# Patient Record
(demographics unavailable — no encounter records)

---

## 2024-10-10 NOTE — DISCUSSION/SUMMARY
[de-identified] : The patient has osteoarthritis of the right hip.  This was exacerbated by her injury.  She is having only mild relief from nabumetone at this point.  Treatment options were discussed.  She is interested in a corticosteroid injection.  She is given a referral for right hip arthrogram and steroid injection.  She will let us know how she is done following the injection.

## 2024-10-10 NOTE — PHYSICAL EXAM
[Slightly Antalgic] : slightly antalgic [LE] : Sensory: Intact in bilateral lower extremities [DP] : dorsalis pedis 2+ and symmetric bilaterally [Normal] : Alert and in no acute distress [Poor Appearance] : well-appearing [Acute Distress] : not in acute distress [Obese] : not obese [de-identified] : The patient has no respiratory distress. Mood and affect are normal. The patient is alert and oriented to person, place and time. The patient reports no pain with motion of the lumbar spine.  There is no lumbar spine tenderness.  There is a scar from prior surgery.  Examination of the hips demonstrates right groin pain with rotation.  There is no tenderness of the trochanter.  There is mild anterior right hip tenderness.  There is no tenderness of either knee.  There are scars at both knees.  Calves are soft and nontender.  The skin is intact.  There is no lymphedema. [de-identified] :  EXAM: [id number] - MR HIP RT  - ORDERED BY: HECTOR WIGGINS PROCEDURE DATE:  09/26/2024 INTERPRETATION:  EXAMINATION: MRI of the right hip without contrast  CLINICAL INFORMATION: Right hip pain  TECHNIQUE: Multiplanar, multisequential MR imaging was performed.  FINDINGS: There is no fracture or osteonecrosis.  There is a physiologic amount of right hip joint fluid. There is complex degenerative tearing of the anterior and superior acetabular labrum. There is high-grade chondral wear along the anterosuperior aspect of the acetabulum with underlying subchondral cystic change. There is also moderate grade chondral wear and deep chondral fissuring of the central superior aspect of the femoral head with underlying subchondral marrow edema. There are small osteophytes on both sides of the hip.  There is chronic moderate to high-grade partial tearing of the gluteus minimus and medius tendons at their insertions and of the hamstring tendons at their origins with some peritendinous edema at these sites. There are no retracted tendon tears. There is moderate to severe atrophy of the gluteus minimus muscle and mild to moderate atrophy of the gluteus medius muscle.  There are no masses or collections along the course of the imaged neurovascular structures.  There are degenerative changes at the pubic symphysis.  IMPRESSION: Moderate to advanced right hip osteoarthritis, as characterized above. Chronic moderate to high-grade partial tearing of the gluteus minimus and medius tendons at their insertions and of the hamstring tendons at their origin.  --- End of Report ---     REYNA NI MD; Attending Radiologist This document has been electronically signed. Sep 26 2024  4:06PM

## 2024-10-10 NOTE — HISTORY OF PRESENT ILLNESS
[TextBox_4] : Ms. MUNOZ is a 77 year old female with a history of orthopedic injuries and decay (spine, shoulders), appendicitis, gall bladder disease, fibroid uterus, HTN, HLD, hypothyroid, IBS, arthritis, PMR, family history of lung cancer (mother), GERD who now comes in for an follow up pulmonary evaluation. Her chief complaint is  -she notes feeling generally well -she notes developing colitis which she is treating with Rx  -she notes issues with her esophagus -she notes poor quality of sleep -she notes nocturia x 3-4  -she notes snoring  -she notes her breathing is stable -she notes sinuses are quiet -she notes frequent cough   -she notes weight is stable -she notes she is trying to lose weight  -she denies having OSAS  -she denies going for a sleep study  -she notes arthritis  -she notes having hernia repair   -she denies any headaches, nausea, emesis, fever, chills, sweats, chest pain, chest pressure, coughing, wheezing, palpitations, diarrhea, constipation, dysphagia, vertigo, myalgias, leg swelling, itchy eyes, itchy ears, heartburn, reflux, or sour taste in the mouth.

## 2024-10-10 NOTE — PROCEDURE
[FreeTextEntry1] : PFTs revealed normal flows; FEV1 was  1.68 L, which is 98% of predicted; normal flow volume loop.  PFTs were performed to evaluate for asthma  FENO was 37; a normal value being less than 25 Fractional exhaled nitric oxide (FENO) is regarded as a simple, noninvasive method for assessing eosinophilic airway inflammation. Produced by a variety of cells within the lung, nitric oxide (NO) concentrations are generally low in healthy individuals. However, high concentrations of NO appear to be involved in nonspecific host defense mechanisms and chronic inflammatory diseases such as asthma. The American Thoracic Society (ATS) therefore has recommended using FENO to aid in the diagnosis and monitoring of eosinophilic airway inflammation and asthma, and for identifying steroid responsive individuals whose chronic respiratory symptoms may be caused by airway inflammation.   The American Thoracic Society (ATS) strongly recommends the use of FeNO measurement to aid in the assessment, management, and long-term monitoring of asthma. In their 2011 clinical practice guideline, the ATS emphasizes the importance of using FeNO.

## 2024-10-10 NOTE — ADDENDUM
The ECG shows a sinus rhythm. ECG rate  = 86 bpm. [FreeTextEntry1] : Documented by Song Proctor acting as a scribe for Dr. Levon Nichole on 10/10/2024. All medical record entries made by the Scribe were at my, Dr. Levon Nichole's, direction and personally dictated by me on 10/10/2024. I have reviewed the chart and agree that the record accurately reflects my personal performance of the history, physical exam, assessment and plan. I have also personally directed, reviewed, and agree with the discharge instructions.

## 2024-10-10 NOTE — HISTORY OF PRESENT ILLNESS
[de-identified] : Patient presents for reevaluation of right hip pain. She had an MRI which was negative for fracture. She is still having significant pain, taking Nabumetone currently with some relief. She would like to discuss injection for the right hip.

## 2024-10-10 NOTE — REASON FOR VISIT
[Follow-Up] : a follow-up visit [TextBox_44] : SOB, chronic cough, chronic rhinitis, LPR/GERD, no bronchiectasis, ?RAY

## 2024-10-25 NOTE — ASSESSMENT
[FreeTextEntry1] : The patient has lengthy hand history with many problems. Today, patient's primary complaints are pain and associated interference with ADLs secondary to basal joint arthritis, bilaterally.  Patient requested bilateral basal joint cortisone injections.  Given symptomatology, given degree of arthritis demonstrated on x-rays today, cortisone injections are indicated.  Right and left thumb basal joint injections were administered after preinjection with lidocaine field block and were well-tolerated without complication.  It is highly likely that her injections were successfully administered intra-articularly, but the possibility that the needle was not intra-articular was discussed and accepted by patient prior to injection. Patient should be cautious in activities for 7 to 14 days.  If comfortable patient can resume regular activities thereafter.  If either thumb does not improve, then ultrasound-guided cortisone injection would be recommended.  Patient tentatively accepts this referral if needed.  Patient has no active trigger fingers.  Patient reports that carpal tunnel syndrome symptoms are not active.  Patient has minimal residual pain left wrist after distal radius/radial styloid fracture.  This does not interfere with function.  Patient has no other active hand problem requiring treatment. Prognosis uncertain/limited.  A lengthy and detailed discussion was held with the patient regarding analysis, treatment, and recommendations. All questions have been answered. At the conclusion the patient expressed acceptance, understanding and agreement with the plan.

## 2024-10-25 NOTE — HISTORY OF PRESENT ILLNESS
[FreeTextEntry1] : Patient is 75 yo LHD female with history of multiple hand problems and treatments.  Past hand history: 1. Right CTR 11/3/2023. First postop visit 11/15/2023. 2. Left CTS cortisone injection 11/3/2023. 3. Right trigger thumb cortisone injection 12/28/2022, 3/29/2023. 4. Left carpal tunnel syndrome, NCS: B/O incomplete study, CTS not confirmed. Cortisone injection 3/29/2023. 5. Right carpal tunnel syndrome NCS positive. Cortisone injection 3/29/2023 6. Lt thumb trigger injection: 9/7/2022, 6/27/2022 7. Right basal joint Kenalog injection: 6/27/2022, 8/11/2021, 9/9/2020 8. Left basal joint Kenalog injection 8/21/2021, May 2016, September 2015 9. Left distal radius fracture 12/4/2023, 1/25/2024: Partial filling of fracture line. 2 mm articular step-off. 2.6 mm scapholunate. Ongoing discomfort.  Orthopedic history: Left reverse TSA Matthias Merino MD, approx 9/27/2022. Pt reports that left reverse TSA "is failing". Patient reports that she has not had reconstruction/revision and would like to avoid it.  TODAY: Patient last seen 1/25/2024.   Patient had appointment scheduled 3/27/2024 but did not present for that appointment. The patient reports pain in both basal joints more severe on right than left. Patient requests cortisone injections for both basal joints, today. Patient states that the pain interferes with function.  I explained that cortisone injection would only provide temporary improvement. Patient acknowledges this limitation but still wishes to have injection. Patient reports no numbness or tingling in fingers. Pt expressed considerable satisfaction regarding improvement following left carpal tunnel cortisone injection last year. Patient is having little to no pain related to the left distal radius fracture. Patient denies locking, triggering or other new hand complaints.

## 2024-10-25 NOTE — PROCEDURE
[] : left and right  [FreeTextEntry1] : The injection was done following verbal timeout site verification, in 2 phases with the first phase being subcutaneous injection of lidocaine 1.5 cc subcutaneously as anesthetic. When adequate level of anesthesia was achieved, the cortisone injection was performed without complication.  KENALOG 40 mg injected bilaterally.  NO Celestone injected. Secondary response to Kenalog with facial flushing, etc., discussed.

## 2024-10-25 NOTE — PHYSICAL EXAM
[de-identified] : Right wrist: Radial artery is slightly tortuous. There is no clinical evidence of aneurysm or other enlargement of the radial artery. painless ROM. Wrist joint line, carpus, radius and ulna: nontender. No evidence of ganglion or other wrist masses. No other notable wrist pertinent positive findings.  Right basal joint: Markedly prominent with first metacarpal adduction. Joint line palpation and joint manipulation causes considerable pain recreating patient's primary complaint. Thumb MP compensatory hyperextension.  No pain associated. right hand: Carpal tunnel incision fully healed and blended with surrounding skin. No A1 pulley tenderness and no triggering in any finger. No pertinent MP, PIP, or DIP joint contributory findings, except some Heberden's nodes; none are clinically painful. No other notable right hand pertinent positive findings.  Left wrist  Very slight tenderness over distal radius dorsally, radially, not volarly. No tenderness over ulna. Slight tenderness over scapholunate interval, which is not particularly bothersome to patient by her description. Scaphoid shift mild discomfort good stability Wrist E/F 45/45; min pain at max flexion/extension No other notable left wrist pertinent positive findings  Basal joint: adduction stiffness and prominence Basal joint line tenderness 1-2+. Basal joint manipulation 1 plus crepitus which recreates symptoms. Pain on the left at the basal joint is much less than right but severe enough that patient requests cortisone injection today. Thumb MP compensatory hyperextension  Left hand No A1 pulley tenderness and no triggering in any finger. No pertinent MP, PIP, or DIP joint contributory findings, except some Heberden's nodes; none are clinically painful.  Neurologic: Median, ulnar, and radial motor and sensory are intact.  Normal light touch sensation at rest. Phalen's test with median nerve compression: Negative bilaterally. Skin: No notable ecchymosis or notable skin changes. No cyanosis, clubbing, or rashes. Vascular: Radial pulses intact. Lymphatic: No streaking or epitrochlear adenopathy. The patient is awake, alert, and oriented. Affect appropriate. Cooperative.   [de-identified] : Radiographs ordered and interpreted by me today, reviewed and discussed with the patient today.  3 views right wrist, basal joint, hand Basal joint marked dysplasia. Large concave dysplastic change distal trapezium with large osteophyte and exostoses formed. Rounding first metacarpal base. Radiographically stage III basal joint arthritis. STT joint spaces maintained. MP and PIP joints without notable degenerative change. Osteophyte formation thumb distal phalanx dorsal radial aspect. Tiny osteophyte ulnar corner distal phalanx DIP 2. No other notable changes. No fractures or dislocations  4 views left wrist, basal joint, hand. Distal radius fracture has healed. 2 mm step-off between scaphoid facet and remaining portion of radius articular surface. Small lucency between scaphoid facet and lunate facet. Small osteophyte ulnar head proximally. Lateral radiograph: Good alignment of distal radius. Minimal if any narrowing between proximal scaphoid and radial edge of lunate facet. Radiolunate and midcarpal spaces maintained. Marked degenerative change between trapezium and first metacarpal with loss of joint space. Large osteophytes present. Subchondral cyst within first metacarpal. MP and PIP joints maintained without notable degenerative change. Mild degenerative change with small marginal radiodensities DIP 2, 3. No evidence of acute fracture or dislocation.

## 2024-10-25 NOTE — HISTORY OF PRESENT ILLNESS
[FreeTextEntry1] : Patient is 77 yo LHD female with history of multiple hand problems and treatments.  Past hand history: 1. Right CTR 11/3/2023. First postop visit 11/15/2023. 2. Left CTS cortisone injection 11/3/2023. 3. Right trigger thumb cortisone injection 12/28/2022, 3/29/2023. 4. Left carpal tunnel syndrome, NCS: B/O incomplete study, CTS not confirmed. Cortisone injection 3/29/2023. 5. Right carpal tunnel syndrome NCS positive. Cortisone injection 3/29/2023 6. Lt thumb trigger injection: 9/7/2022, 6/27/2022 7. Right basal joint Kenalog injection: 6/27/2022, 8/11/2021, 9/9/2020 8. Left basal joint Kenalog injection 8/21/2021, May 2016, September 2015 9. Left distal radius fracture 12/4/2023, 1/25/2024: Partial filling of fracture line. 2 mm articular step-off. 2.6 mm scapholunate. Ongoing discomfort.  Orthopedic history: Left reverse TSA Matthias Merino MD, approx 9/27/2022. Pt reports that left reverse TSA "is failing". Patient reports that she has not had reconstruction/revision and would like to avoid it.  TODAY: Patient last seen 1/25/2024.   Patient had appointment scheduled 3/27/2024 but did not present for that appointment. The patient reports pain in both basal joints more severe on right than left. Patient requests cortisone injections for both basal joints, today. Patient states that the pain interferes with function.  I explained that cortisone injection would only provide temporary improvement. Patient acknowledges this limitation but still wishes to have injection. Patient reports no numbness or tingling in fingers. Pt expressed considerable satisfaction regarding improvement following left carpal tunnel cortisone injection last year. Patient is having little to no pain related to the left distal radius fracture. Patient denies locking, triggering or other new hand complaints.

## 2024-10-25 NOTE — PHYSICAL EXAM
[de-identified] : Right wrist: Radial artery is slightly tortuous. There is no clinical evidence of aneurysm or other enlargement of the radial artery. painless ROM. Wrist joint line, carpus, radius and ulna: nontender. No evidence of ganglion or other wrist masses. No other notable wrist pertinent positive findings.  Right basal joint: Markedly prominent with first metacarpal adduction. Joint line palpation and joint manipulation causes considerable pain recreating patient's primary complaint. Thumb MP compensatory hyperextension.  No pain associated. right hand: Carpal tunnel incision fully healed and blended with surrounding skin. No A1 pulley tenderness and no triggering in any finger. No pertinent MP, PIP, or DIP joint contributory findings, except some Heberden's nodes; none are clinically painful. No other notable right hand pertinent positive findings.  Left wrist  Very slight tenderness over distal radius dorsally, radially, not volarly. No tenderness over ulna. Slight tenderness over scapholunate interval, which is not particularly bothersome to patient by her description. Scaphoid shift mild discomfort good stability Wrist E/F 45/45; min pain at max flexion/extension No other notable left wrist pertinent positive findings  Basal joint: adduction stiffness and prominence Basal joint line tenderness 1-2+. Basal joint manipulation 1 plus crepitus which recreates symptoms. Pain on the left at the basal joint is much less than right but severe enough that patient requests cortisone injection today. Thumb MP compensatory hyperextension  Left hand No A1 pulley tenderness and no triggering in any finger. No pertinent MP, PIP, or DIP joint contributory findings, except some Heberden's nodes; none are clinically painful.  Neurologic: Median, ulnar, and radial motor and sensory are intact.  Normal light touch sensation at rest. Phalen's test with median nerve compression: Negative bilaterally. Skin: No notable ecchymosis or notable skin changes. No cyanosis, clubbing, or rashes. Vascular: Radial pulses intact. Lymphatic: No streaking or epitrochlear adenopathy. The patient is awake, alert, and oriented. Affect appropriate. Cooperative.   [de-identified] : Radiographs ordered and interpreted by me today, reviewed and discussed with the patient today.  3 views right wrist, basal joint, hand Basal joint marked dysplasia. Large concave dysplastic change distal trapezium with large osteophyte and exostoses formed. Rounding first metacarpal base. Radiographically stage III basal joint arthritis. STT joint spaces maintained. MP and PIP joints without notable degenerative change. Osteophyte formation thumb distal phalanx dorsal radial aspect. Tiny osteophyte ulnar corner distal phalanx DIP 2. No other notable changes. No fractures or dislocations  4 views left wrist, basal joint, hand. Distal radius fracture has healed. 2 mm step-off between scaphoid facet and remaining portion of radius articular surface. Small lucency between scaphoid facet and lunate facet. Small osteophyte ulnar head proximally. Lateral radiograph: Good alignment of distal radius. Minimal if any narrowing between proximal scaphoid and radial edge of lunate facet. Radiolunate and midcarpal spaces maintained. Marked degenerative change between trapezium and first metacarpal with loss of joint space. Large osteophytes present. Subchondral cyst within first metacarpal. MP and PIP joints maintained without notable degenerative change. Mild degenerative change with small marginal radiodensities DIP 2, 3. No evidence of acute fracture or dislocation.

## 2024-10-25 NOTE — PHYSICAL EXAM
[de-identified] : Right wrist: Radial artery is slightly tortuous. There is no clinical evidence of aneurysm or other enlargement of the radial artery. painless ROM. Wrist joint line, carpus, radius and ulna: nontender. No evidence of ganglion or other wrist masses. No other notable wrist pertinent positive findings.  Right basal joint: Markedly prominent with first metacarpal adduction. Joint line palpation and joint manipulation causes considerable pain recreating patient's primary complaint. Thumb MP compensatory hyperextension.  No pain associated. right hand: Carpal tunnel incision fully healed and blended with surrounding skin. No A1 pulley tenderness and no triggering in any finger. No pertinent MP, PIP, or DIP joint contributory findings, except some Heberden's nodes; none are clinically painful. No other notable right hand pertinent positive findings.  Left wrist  Very slight tenderness over distal radius dorsally, radially, not volarly. No tenderness over ulna. Slight tenderness over scapholunate interval, which is not particularly bothersome to patient by her description. Scaphoid shift mild discomfort good stability Wrist E/F 45/45; min pain at max flexion/extension No other notable left wrist pertinent positive findings  Basal joint: adduction stiffness and prominence Basal joint line tenderness 1-2+. Basal joint manipulation 1 plus crepitus which recreates symptoms. Pain on the left at the basal joint is much less than right but severe enough that patient requests cortisone injection today. Thumb MP compensatory hyperextension  Left hand No A1 pulley tenderness and no triggering in any finger. No pertinent MP, PIP, or DIP joint contributory findings, except some Heberden's nodes; none are clinically painful.  Neurologic: Median, ulnar, and radial motor and sensory are intact.  Normal light touch sensation at rest. Phalen's test with median nerve compression: Negative bilaterally. Skin: No notable ecchymosis or notable skin changes. No cyanosis, clubbing, or rashes. Vascular: Radial pulses intact. Lymphatic: No streaking or epitrochlear adenopathy. The patient is awake, alert, and oriented. Affect appropriate. Cooperative.   [de-identified] : Radiographs ordered and interpreted by me today, reviewed and discussed with the patient today.  3 views right wrist, basal joint, hand Basal joint marked dysplasia. Large concave dysplastic change distal trapezium with large osteophyte and exostoses formed. Rounding first metacarpal base. Radiographically stage III basal joint arthritis. STT joint spaces maintained. MP and PIP joints without notable degenerative change. Osteophyte formation thumb distal phalanx dorsal radial aspect. Tiny osteophyte ulnar corner distal phalanx DIP 2. No other notable changes. No fractures or dislocations  4 views left wrist, basal joint, hand. Distal radius fracture has healed. 2 mm step-off between scaphoid facet and remaining portion of radius articular surface. Small lucency between scaphoid facet and lunate facet. Small osteophyte ulnar head proximally. Lateral radiograph: Good alignment of distal radius. Minimal if any narrowing between proximal scaphoid and radial edge of lunate facet. Radiolunate and midcarpal spaces maintained. Marked degenerative change between trapezium and first metacarpal with loss of joint space. Large osteophytes present. Subchondral cyst within first metacarpal. MP and PIP joints maintained without notable degenerative change. Mild degenerative change with small marginal radiodensities DIP 2, 3. No evidence of acute fracture or dislocation.

## 2024-10-25 NOTE — PHYSICAL EXAM
[de-identified] : Right wrist: Radial artery is slightly tortuous. There is no clinical evidence of aneurysm or other enlargement of the radial artery. painless ROM. Wrist joint line, carpus, radius and ulna: nontender. No evidence of ganglion or other wrist masses. No other notable wrist pertinent positive findings.  Right basal joint: Markedly prominent with first metacarpal adduction. Joint line palpation and joint manipulation causes considerable pain recreating patient's primary complaint. Thumb MP compensatory hyperextension.  No pain associated. right hand: Carpal tunnel incision fully healed and blended with surrounding skin. No A1 pulley tenderness and no triggering in any finger. No pertinent MP, PIP, or DIP joint contributory findings, except some Heberden's nodes; none are clinically painful. No other notable right hand pertinent positive findings.  Left wrist  Very slight tenderness over distal radius dorsally, radially, not volarly. No tenderness over ulna. Slight tenderness over scapholunate interval, which is not particularly bothersome to patient by her description. Scaphoid shift mild discomfort good stability Wrist E/F 45/45; min pain at max flexion/extension No other notable left wrist pertinent positive findings  Basal joint: adduction stiffness and prominence Basal joint line tenderness 1-2+. Basal joint manipulation 1 plus crepitus which recreates symptoms. Pain on the left at the basal joint is much less than right but severe enough that patient requests cortisone injection today. Thumb MP compensatory hyperextension  Left hand No A1 pulley tenderness and no triggering in any finger. No pertinent MP, PIP, or DIP joint contributory findings, except some Heberden's nodes; none are clinically painful.  Neurologic: Median, ulnar, and radial motor and sensory are intact.  Normal light touch sensation at rest. Phalen's test with median nerve compression: Negative bilaterally. Skin: No notable ecchymosis or notable skin changes. No cyanosis, clubbing, or rashes. Vascular: Radial pulses intact. Lymphatic: No streaking or epitrochlear adenopathy. The patient is awake, alert, and oriented. Affect appropriate. Cooperative.   [de-identified] : Radiographs ordered and interpreted by me today, reviewed and discussed with the patient today.  3 views right wrist, basal joint, hand Basal joint marked dysplasia. Large concave dysplastic change distal trapezium with large osteophyte and exostoses formed. Rounding first metacarpal base. Radiographically stage III basal joint arthritis. STT joint spaces maintained. MP and PIP joints without notable degenerative change. Osteophyte formation thumb distal phalanx dorsal radial aspect. Tiny osteophyte ulnar corner distal phalanx DIP 2. No other notable changes. No fractures or dislocations  4 views left wrist, basal joint, hand. Distal radius fracture has healed. 2 mm step-off between scaphoid facet and remaining portion of radius articular surface. Small lucency between scaphoid facet and lunate facet. Small osteophyte ulnar head proximally. Lateral radiograph: Good alignment of distal radius. Minimal if any narrowing between proximal scaphoid and radial edge of lunate facet. Radiolunate and midcarpal spaces maintained. Marked degenerative change between trapezium and first metacarpal with loss of joint space. Large osteophytes present. Subchondral cyst within first metacarpal. MP and PIP joints maintained without notable degenerative change. Mild degenerative change with small marginal radiodensities DIP 2, 3. No evidence of acute fracture or dislocation.

## 2024-11-27 NOTE — END OF VISIT
[FreeTextEntry3] : I personally saw and examined GRETA MUNOZ in detail. I spoke to VIRGINIA Blakely regarding the assessment and plan of care. I performed the procedures and relevant physical exam. I have made changes to the body of the note wherever necessary and appropriate.

## 2024-11-27 NOTE — HISTORY OF PRESENT ILLNESS
[de-identified] : Ms. MUNOZ is a 77 year female here for ear check feels she has wax build up, prior cerumen removal over the summer prior audio 7/2024 moderate SNHL AU. no changes in hearing today

## 2024-11-27 NOTE — ASSESSMENT
[FreeTextEntry1] : Ms. MUNOZ is a 77 year female with SNHL AU here for ear check.   -Cerumen is removed from the right and left ear canal with a suction and curettage -Rx: Debrox OTC drops can be placed in both ears on a weekly basis to keep them free of wax. -annual audio in July 2025 - f/up 3 months

## 2025-01-02 NOTE — HISTORY OF PRESENT ILLNESS
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 Laura Patient canceled previously scheduled upper endoscopy with planned dilation of cricopharyngeal bar Follows up now several months later Complains of hoarseness, difficulty swallowing again discussed, more clearly related to liquids rather than solid or pills Patient may or may not have had a modified barium swallow in the past Fluctuating bowel habit, days of constipation, diarrhea Patient alternating between Lomotil and MiraLAX Patient also with a history of underlying colitis reported on mesalamine Denies any bleeding, denies any weight loss

## 2025-01-02 NOTE — REASON FOR VISIT
[Follow-up] : a follow-up of an existing diagnosis [FreeTextEntry1] : Dysphagia, reflux, irritable bowel syndrome, history of colitis

## 2025-01-02 NOTE — ASSESSMENT
[FreeTextEntry1] : Dysphagia to liquids cannot rule out oropharyngeal dysphagia Complaints seem to be less likely related to narrowing, mechanical obstruction such as cricopharyngeal bar Ongoing reflux on proton pump inhibitor and H2 blocker daily May be a future candidate for voqunza Irritable bowel alternating between constipation and diarrhea.  Patient agreeable to therapeutic trial of daily fiber supplement May continue MiraLAX and Lomotil as needed in the interim The patient's complaints sound more irritable bowel related, cannot rule out underlying active colitis.  Patient agreeable to fecal calprotectin Telephone follow-up as results become available, office visit again in 3 months

## 2025-03-11 NOTE — REASON FOR VISIT
MRA reviewed with Dr. 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 Next MRA in 1 yr. She has seen the [facility] MRI report but has not d/w Dr. Torres. She will contact his office to discuss.   
[Follow-Up: _____] : a [unfilled] follow-up visit

## 2025-03-13 NOTE — HISTORY OF PRESENT ILLNESS
[de-identified] :  Ana is a 77 year old female here for f/u s/p robotic assisted B/L inguinal hernia repair w/ mesh 03/07/2024.  Patient reports about 3 weeks ago, she began to experience inguinal pain with walking, symptom more prominent on the right than the left.  Patient denies any changes of her bowel movement.  No lumps or bulges.
[de-identified] :  Ana is a 77 year old female here for f/u s/p robotic assisted B/L inguinal hernia repair w/ mesh 03/07/2024.  Patient reports about 3 weeks ago, she began to experience inguinal pain with walking, symptom more prominent on the right than the left.  Patient denies any changes of her bowel movement.  No lumps or bulges.
[de-identified] :  Ana is a 77 year old female here for f/u s/p robotic assisted B/L inguinal hernia repair w/ mesh 03/07/2024.  Patient reports about 3 weeks ago, she began to experience inguinal pain with walking, symptom more prominent on the right than the left.  Patient denies any changes of her bowel movement.  No lumps or bulges.
The resident's documentation has been prepared under my direction and personally reviewed by me in its entirety. I confirm that the note above accurately reflects all work, treatment, procedures, and medical decision making performed by me.  MEAGAN Johnson MD [facility] Attending

## 2025-03-13 NOTE — ASSESSMENT
[FreeTextEntry1] : Patient presents with Functional Oral and Mild Pharyngeal Stage Dysphagia. The Oral Stage is characterized by adequate oral containment, adequate chewing for solid, adequate bolus manipulation, adequate tongue motion for anterior to posterior transfer of the bolus for puree/solids with adequate oral clearance.   The Pharyngeal Stage is characterized by adequate initiation of the pharyngeal swallow, adequate laryngeal elevation, reduced tongue base retraction and reduced pharyngeal constriction. There is trace/mild pharyngeal clearance deficits located on the lateral pharyngeal wall post swallow for puree/solids; trace pharyngeal clearance deficits located on the vallecular/pyriforms for thin liquids. A reswallow and/or liquid wash improves pharyngeal clearance.  There was No Aspiration observed before, during or after the swallow for puree, solids, thin liquids.   RESULTS:  No Aspiration observed before, during or after the swallow for puree, solids, thin liquids.   Of Note: Patient was given a Puree and Barium Tablet in Anterior Posterior view. An Esophageal Screen was performed. The Barium Tablet was observed to course through the pharynx/esophagus without hold up. This cannot be considered as a full complete evaluation of the esophagus.   Of Note: incidental finding of a cricopharyngeal bar (as per Radiologist) non-obstructing for bolus passage.

## 2025-03-13 NOTE — PLAN
[FreeTextEntry1] : 77-year-old female status post bilateral inguinal hernia repair in March, 2024 complains of new inguinal pain with walking for about 3 weeks, right > left.  Patient advised that she may take Tylenol or Motrin as needed for inguinal pain, and we will order CT of abd/pelvis for further evaluation.  follow up after CT to discuss result and management plan based CT study.   All questions and concerns answered.  Patient verbalized understanding of all discussion today.  Rosenda Talley MD [facility] [address] [place] [address] tel. [phone] fax [phone].

## 2025-03-13 NOTE — HISTORY OF PRESENT ILLNESS
[FreeTextEntry1] : Patient arrived to Radiology for an Outpatient Modified Barium Swallow Study. Patient is a 78 y/o female with PMH as per EMR:  Active Problems Acute pain of left wrist (719.43) (M25.532) Asymmetric SNHL (sensorineural hearing loss) (389.16) (H90.3) Atypical facial pain (350.2) (G50.1) Bilateral hip pain (719.45) (M25.551,M25.552) Bilateral impacted cerumen (380.4) (H61.23) Bilateral inguinal hernia (550.92) (K40.20) Bursitis of left hip (726.5) (M70.72) Carpal tunnel syndrome of left wrist (354.0) (G56.02) Carpal tunnel syndrome of right wrist (354.0) (G56.01) Chronic colitis (558.9) (K52.9) Chronic cough (786.2) (R05.3) Chronic nasal congestion (478.19) (R09.81) Clogged ear (388.8) (H93.8X9) Closed displaced fracture of styloid process of left radius, sequela (905.2) (S52.512S) Cough (786.2) (R05.9) 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5) Hypertension, unspecified type (401.9) (I10) Hypothyroidism (244.9) (E03.9) Incisional hernia (553.21) (K43.2) Increased frequency of urination (788.41) (R35.0) Influenza B (487.1) (J10.1) Injury of right hip, initial encounter (959.6) (S79.911A) Irritable bowel syndrome, unspecified type (564.1) (K58.9) Left hip pain (719.45) (M25.552) Low back pain (724.2) (M54.50) LPRD (laryngopharyngeal reflux disease) (478.79) (K21.9) Mass of right wrist (719.63) (R22.31) 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) Crisostomo's neuroma of right foot (355.6) (G57.61) Nasal congestion (478.19) (R09.81) Nocturia (788.43) (R35.1) Osteoarthritis of carpometacarpal joint of left thumb (715.34) (M18.12) Overweight (278.02) (E66.3) Pain in pelvis (R10.2) PND (post-nasal drip) (784.91) (R09.82) Polymyalgia rheumatica (725) (M35.3) Poor balance (781.99) (R26.89) Primary localized osteoarthritis of right hip (715.15) (M16.11) Primary osteoarthritis of first carpometacarpal joint of right hand (715.14) (M18.11) Right hip pain (719.45) (M25.551) S/P lumbar spinal fusion (V45.4) (Z98.1) Sialoadenitis (527.2) (K11.20) Small intestinal bacterial overgrowth (569.89) (K63.8219) SNHL (sensorineural hearing loss) (389.10) (H90.5) Snoring (786.09) (R06.83) SOB (shortness of breath) (786.05) (R06.02) Status post total bilateral knee replacement (V43.65) (Z96.653) Status post total left knee replacement (V43.65) (Z96.652) Status post total right knee replacement (V43.65) (Z96.651) Tongue lesion (529.8) (K14.8) Tongue pain (529.6) (K14.6) Trigger thumb of left hand (727.03) (M65.312) Trigger thumb of right hand (727.03) (M65.311) Trochanteric bursitis of left hip (726.5) (M70.62) Trochanteric bursitis of right hip (726.5) (M70.61) Urge and stress incontinence (788.33) (N39.46) Urinary tract infection due to Proteus (599.0,041.6) (N39.0,B96.4) Urinary urgency (788.63) (R39.15) UTI symptoms (788.99) (R39.9) Vaginal atrophy (627.3) (N95.2)  Past Medical History History of Back problem (724.9) (M53.9) History of High cholesterol (272.0) (E78.00) History of appendicitis (V12.79) (Z87.19) History of arthritis (V13.4) (Z87.39) History of asthma (V12.69) (Z87.09) History of gallbladder disease (V12.79) (Z87.19) History of hypothyroidism (V12.29) (Z86.39) History of melanoma (V10.82) (Z85.820) History of uterine leiomyoma (V13.29) (Z86.018) Normal delivery (650) (O80) History of Trochanteric bursitis of right hip (726.5) (M70.61)      GI Note 1/2/2025 - 77-year-old female 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 Laura Patient canceled previously scheduled upper endoscopy with planned dilation of cricopharyngeal bar Follows up now several months later Complains of hoarseness, difficulty swallowing again discussed, more clearly related to liquids rather than solid or pills Patient may or may not have had a modified barium swallow in the past Fluctuating bowel habit, days of constipation, diarrhea Patient alternating between Lomotil and MiraLAX Patient also with a history of underlying colitis reported on mesalamine Denies any bleeding, denies any weight loss   Today, Patient offers c/o "dry cough, constant during the day" for the past couple of months "I had Norovirus 3 months ago"  Patient also reports "I choke when I drink, I feel like I get spasms, go down the wrong way, I cough and I can't stop right away" x 1 year. Patient reports that she went for a CT Scan of Chest and Pelvis yesterday 3/12/2025.  Patient reports having change in her vocal quality "raspy" voice.   No current/repeated pneumonia. Patient eats Regular with Thin Liquids.  This Modified Barium Swallow Study is to objectively assess the Physiology of the Oral and Pharyngeal Stage swallowing mechanism for treatment plan for least restrictive diet.    Referring MD: Dr. Aubrey Hager

## 2025-03-13 NOTE — PHYSICAL EXAM
[Alert] : alert [Oriented to Person] : oriented to person [Oriented to Place] : oriented to place [Oriented to Time] : oriented to time [Calm] : calm [de-identified] : Well-appearing 77-year-old female in NAD [de-identified] : Supple [de-identified] : Nonlabored respiration on room air [de-identified] : Soft, nontender, nondistended [de-identified] : No palpable bulge in the right or left inguinal region

## 2025-03-13 NOTE — PLAN
[FreeTextEntry1] : 77-year-old female status post bilateral inguinal hernia repair in March, 2024 complains of new inguinal pain with walking for about 3 weeks, right > left.  Patient advised that she may take Tylenol or Motrin as needed for inguinal pain, and we will order CT of abd/pelvis for further evaluation.  follow up after CT to discuss result and management plan based CT study.   All questions and concerns answered.  Patient verbalized understanding of all discussion today.  Rosenda Talley MD [facility] [address] [place] [address] tel. [phone] fax [phone].

## 2025-03-13 NOTE — PHYSICAL EXAM
[Alert] : alert [Oriented to Person] : oriented to person [Oriented to Place] : oriented to place [Oriented to Time] : oriented to time [Calm] : calm [de-identified] : Well-appearing 77-year-old female in NAD [de-identified] : Supple [de-identified] : Nonlabored respiration on room air [de-identified] : Soft, nontender, nondistended [de-identified] : No palpable bulge in the right or left inguinal region

## 2025-03-13 NOTE — PLAN
[FreeTextEntry1] : 77-year-old female status post bilateral inguinal hernia repair in March, 2024 complains of new inguinal pain with walking for about 3 weeks, right > left.  Patient advised that she may take Tylenol or Motrin as needed for inguinal pain, and we will order CT of abd/pelvis for further evaluation.  follow up after CT to discuss result and management plan based CT study.   All questions and concerns answered.  Patient verbalized understanding of all discussion today.  Rosenda Talley MD [facility] [address] [place] [address] tel. [phone] fax [phone].

## 2025-03-13 NOTE — PLAN
[FreeTextEntry2] :  1.) Continue with current diet regimen of Regular and Thin Liquids 2.) Feeding/Swallowing Guidelines: Upright position, small bites, chew well, single cup sip of thin liquids, 2 swallows per bite/sip; alternate with a liquid wash after ever 2 to 3 bites 3.) Aspiration Precautions 4.) Reflux Precautions 5.) Maintain Good Oral Hygiene Care 6.) Follow up with Physician 7.) Consider ENT Consultation given patient reports vocal changes "raspy" voice at MD's discretion and Patient may benefit Voice Evaluation/Voice Therapy upon ENT clearance. 8.) Swallow Therapy to maximize swallow mechanism  SLP provided Patient education regarding preliminary results. Patient verbalized understanding and will follow up with Physician.

## 2025-03-13 NOTE — ASSESSMENT
[FreeTextEntry1] :  77-year-old female status post bilateral inguinal hernia repair on 3/7/2024 complains of new inguinal pain with walking for about three wks, right > left.

## 2025-03-13 NOTE — PHYSICAL EXAM
[Alert] : alert [Oriented to Person] : oriented to person [Oriented to Place] : oriented to place [Oriented to Time] : oriented to time [Calm] : calm [de-identified] : Well-appearing 77-year-old female in NAD [de-identified] : Supple [de-identified] : Nonlabored respiration on room air [de-identified] : Soft, nontender, nondistended [de-identified] : No palpable bulge in the right or left inguinal region

## 2025-03-14 NOTE — ASSESSMENT
[FreeTextEntry1] : Ms. MUNOZ is a 77 year female with SNHL AU with hoarseness / PND. Scope shows b/l OMC clear, thick secretions covering the adenoids, VC mobile no masses or polyps, mild LPRD  adenoiditis - RX Augmentin x 10 days - will start daily sinus rinses with added Budesonide. Instructions and information provided, advised pt we will go through [facility] if meds are not covered  hearing loss - cerumen removed L ear - audio today shows SNHL  - medically cleared for hearing aids - copy of audio and clearance provided - information given to obtain hearing aids f/u 1 month

## 2025-03-14 NOTE — HISTORY OF PRESENT ILLNESS
[de-identified] : Ms. MUNOZ is a 77 year female previously seen for cerumen impaction prior audio 7/2024 moderate SNHL AU. no changes in hearing today [FreeTextEntry1] : pt here for ear check, feels hearing is worsening now with worsening hoarseness yesterday had MBS 2/2 hoarseness / choking with liquids - cricopharyngeal bar non obstructing, no aspiration she has been on aciphex daily for years she has yellow/green mucous from the nose and PND for last few months on Flonase daily since the summer

## 2025-03-14 NOTE — REASON FOR VISIT
[Subsequent Evaluation] : a subsequent evaluation for [FreeTextEntry2] : ear check decreased hearing

## 2025-03-14 NOTE — PROCEDURE
[Anterior rhinoscopy insufficient to account for symptoms] : anterior rhinoscopy insufficient to account for symptoms [Unable to Cooperate with Mirror] : patient unable to cooperate with mirror [de-identified] : Anila Pyle MD [de-identified] :  Fiberoptic nasal endoscopy was performed.  R/b/a of procedure was explained to the patient and they agreed to proceed with procedure.    normal mucosa, normal b/l inferior, middle and superior turbinates, inferior, middle and superior meati and sphenoethmoidal recess.  No nasal polyps.  Septum midline B/L OMC CLEAR, NP with thick PND from adenoid bed  scope #: [id number]  [de-identified] :  Fiberoptic laryngoscopy was performed on the patient.  R/b/a was explained to the patient and they agreed to proceed with procedure.  Nasal cavities: clear b/l, Nasopharynx: mild erythema and swelling, and PND Oropharynx/Hypopharynx: + lingual tonsillar hypertrophy no masses or lesions, posterior pharyngeal wall with cobblestoning.  No BOT hypertrophy, vallecula is clear of any masses or cysts, Supraglottis: epiglottis sharp with normal bilateral arytenoids, aryepiglottic folds, ventricles but with + interarytenoid edema consistent with reflux, Glottis: clear, TVCs mobile b/l.  Subglottis clear  No pooling of secretions in the pyriform sinus.  Airway patent   Scope #: 215

## 2025-03-14 NOTE — PHYSICAL EXAM
[Normal] : mucosa is normal [Midline] : trachea located in midline position [Laryngoscopy Performed] : laryngoscopy was performed, see procedure section for findings [de-identified] : L cerumen / MARIO norris

## 2025-03-26 NOTE — ASSESSMENT
[FreeTextEntry1] : Patient has longstanding left carpal tunnel syndrome.  Symptoms are reached a point where the patient has frequent symptoms that can be bothersome and interfere with a wide range of ADLs.  Surgery is now indicated. Of note patient had right carpal tunnel release approximately 1.5 years ago with excellent outcome Bilateral basal joints are not currently symptomatic, however, if symptoms flare then 1 or both basal joints will be treated at the time of surgery with intra-articular steroid injections. I reviewed treatment options risk and complications at length and in detail with patient.  Patient states that she would like to proceed with left carpal tunnel release which will be scheduled under regional anesthesia on an ambulatory basis. Basal joints will be assessed preoperatively regarding possible cortisone injection.  Surgery, carpal tunnel release, for left carpal tunnel syndrome is indicated because of symptoms refractory to conservative treatment, interfering with sleep, and activities of daily living. Because of the duration and severity of carpal tunnel syndrome, ongoing symptoms can be expected postoperatively. While the patient may see improvement, there is no assurance of this. The possibility of little improvement or of no improvement exists. Even though it is low, the possibility of worsening exists as well. Risk of injury to the median nerve, CRPS, persistent paresthesias, wound related pain, weakness, and many other factors, reviewed and discussed. A lengthy and detailed discussion has been held with the patient. The surgical plan, alternative treatments, and the associated risks, complications, limitations, and expectations have been discussed with the patient. Postoperative plan, need for exercising to regain motion and function, wound care, dressing care, activities, follow up, and possible need for hand therapy have been reviewed and discussed. In addition, the expectation of postop wound related pain, wound induration, swelling, weakness of , alteration of hand and finger use and function, and palmar and forearm tenderness were reviewed. In particular, the expectation of weakness, difficulty with daily activities, and wound induration often lasting six months have been stressed.  All questions have been answered. The patient has expressed understanding and acceptance of the above and has consented to surgery.

## 2025-03-26 NOTE — PHYSICAL EXAM
[de-identified] : Right wrist: Radial artery is slightly tortuous. There is no clinical evidence of aneurysm or other enlargement of the radial artery. painless ROM. Wrist joint line, carpus, radius and ulna: nontender. No evidence of ganglion or other wrist masses. No other notable wrist pertinent positive findings.  Right basal joint: Markedly prominent with first metacarpal adduction. Joint line palpation and joint manipulation minimal pain. Thumb MP compensatory hyperextension.  No pain associated. right hand: Carpal tunnel incision fully healed and blended with surrounding skin. No A1 pulley tenderness and no triggering in any finger. No pertinent MP, PIP, or DIP joint contributory findings, except some Heberden's nodes; none are clinically painful. No other notable right hand pertinent positive findings.  Left wrist  Very slight tenderness over distal radius dorsally, radially, not volarly. No tenderness over ulna. Slight tenderness over scapholunate interval, which is not particularly bothersome to patient by her description. Scaphoid shift mild discomfort good stability Wrist E/F 45/45; min pain at max flexion/extension No other notable left wrist pertinent positive findings  Basal joint: adduction stiffness and prominence Basal joint line tenderness 1-2+. Basal joint manipulation 1 plus crepitus, minimal pain Thumb MP compensatory hyperextension  Left hand No A1 pulley tenderness and no triggering in any finger. No pertinent MP, PIP, or DIP joint contributory findings, except some Heberden's nodes; none are clinically painful.  Neurologic:  Left hand: Slightly altered sensation median distribution at rest.   Right hand: Normal light touch sensation at rest. Phalen's test with median nerve compression: Exacerbate symptoms on the left within 30 seconds Negative on right. Radial nerve motor and sensory and ulnar nerve motor and sensory are intact.  Skin: No notable ecchymosis or notable skin changes. No cyanosis, clubbing, or rashes. Vascular: Radial pulses intact. Lymphatic: No streaking or epitrochlear adenopathy. The patient is awake, alert, and oriented. Affect appropriate. Cooperative.

## 2025-03-26 NOTE — HISTORY OF PRESENT ILLNESS
[FreeTextEntry1] : Patient is 75 yo LHD female with history of multiple hand problems and treatments.  Past hand history: 1. Right CTR 11/3/2023. First postop visit 11/15/2023. 2. Left CTS cortisone injection 11/3/2023. 3. Right trigger thumb cortisone injection 12/28/2022, 3/29/2023. 4. Left carpal tunnel syndrome, NCS: B/O incomplete study, CTS not confirmed. Cortisone injection 3/29/2023. 5. Right carpal tunnel syndrome NCS positive. Cortisone injection 3/29/2023 6. Lt thumb trigger injection: 9/7/2022, 6/27/2022 7. Right basal joint Kenalog injection: 10/23/2024, 6/27/2022, 8/11/2021, 9/9/2020,  8. Left basal joint Kenalog injection: 10/23/2024, 8/21/2021, May 2016, September 2015 9. Left distal radius fracture 12/4/2023, 1/25/2024: Partial filling of fracture line. 2 mm articular step-off. 2.6 mm scapholunate. Ongoing discomfort.  Orthopedic history: Left reverse TSA Matthias Merino MD, approx 9/27/2022. Pt reports that left reverse TSA "is failing". Patient reports that she has not had reconstruction/revision and would like to avoid it.  TODAY: Patient last seen 10/23/2024 for bilateral basal joint arthritis pain.  Patient requested and was treated with bilateral basal joint cortisone injections utilizing Kenalog 40 mg. The patient presents b/o increasing left hand CTS symptoms: driving, phone, holding, nighttime, and often during day. Basal joint injections administered October 2024 provided relief and patient notes ongoing relief in basal joints following those injections.

## 2025-03-26 NOTE — ADDENDUM
[FreeTextEntry1] : Documented by Daphne Lucas acting as a scribe for Dr. Levon Nichole on 03/26/2025. All medical record entries made by the Scribe were at my, Dr. Levon Nichole's, direction and personally dictated by me on 03/26/2025. I have reviewed the chart and agree that the record accurately reflects my personal performance of the history, physical exam, assessment and plan. I have also personally directed, reviewed, and agree with the discharge instructions.

## 2025-03-26 NOTE — HISTORY OF PRESENT ILLNESS
[TextBox_4] : Ms. MUNOZ is a 77-year-old female with a history of orthopedic injuries and decay (spine, shoulders), appendicitis, gall bladder disease, fibroid uterus, HTN, HLD, hypothyroid, IBS, arthritis, PMR, family history of lung cancer (mother), GERD who now comes in for a follow-up pulmonary evaluation. Her chief complaint is  -she notes s/p hernia surgery at [facility] with Dr. Talley -she notes groin pain due to abdominal muscle issues -she notes dysphonia -she notes active heartburn/reflux, which has worsened. She's seeing Dr. Hager in 1 week -she notes frequent throat clearing -she notes PNDrip -she notes she's using the Neti Pot with saline and Flonase since 1 week ago -she notes she has a follow-up scheduled with Dr. Pyle -she notes trouble falling asleep due to pain -she notes weight is stable but high -she denies exercising due to prior back surgery, knee surgeries, and shoulder surgeries. Screws came out of her back and left shoulder. Her doctors won't re-operate on her shoulder -she notes having a nonproductive single cough daily at any time of the day, without any known trigger -she denies cold air triggering her cough -she thinks dry mouth could cause the cough -she denies taking medication for her cough -she notes her Amlodipine dose was decreased -she notes appetite is stable  -she doesn't have access to a pool in [place]. She goes in the pool when she's in [place] -she notes she's on AcipHex -she denies wheezing -she denies SOB  -she denies any headaches, nausea, emesis, fever, chills, sweats, chest pain, chest pressure, palpitations, diarrhea, constipation, dysphagia, vertigo, leg swelling, itchy eyes, itchy ears, or sour taste in the mouth.

## 2025-03-26 NOTE — PHYSICAL EXAM
[de-identified] : Right wrist: Radial artery is slightly tortuous. There is no clinical evidence of aneurysm or other enlargement of the radial artery. painless ROM. Wrist joint line, carpus, radius and ulna: nontender. No evidence of ganglion or other wrist masses. No other notable wrist pertinent positive findings.  Right basal joint: Markedly prominent with first metacarpal adduction. Joint line palpation and joint manipulation minimal pain. Thumb MP compensatory hyperextension.  No pain associated. right hand: Carpal tunnel incision fully healed and blended with surrounding skin. No A1 pulley tenderness and no triggering in any finger. No pertinent MP, PIP, or DIP joint contributory findings, except some Heberden's nodes; none are clinically painful. No other notable right hand pertinent positive findings.  Left wrist  Very slight tenderness over distal radius dorsally, radially, not volarly. No tenderness over ulna. Slight tenderness over scapholunate interval, which is not particularly bothersome to patient by her description. Scaphoid shift mild discomfort good stability Wrist E/F 45/45; min pain at max flexion/extension No other notable left wrist pertinent positive findings  Basal joint: adduction stiffness and prominence Basal joint line tenderness 1-2+. Basal joint manipulation 1 plus crepitus, minimal pain Thumb MP compensatory hyperextension  Left hand No A1 pulley tenderness and no triggering in any finger. No pertinent MP, PIP, or DIP joint contributory findings, except some Heberden's nodes; none are clinically painful.  Neurologic:  Left hand: Slightly altered sensation median distribution at rest.   Right hand: Normal light touch sensation at rest. Phalen's test with median nerve compression: Exacerbate symptoms on the left within 30 seconds Negative on right. Radial nerve motor and sensory and ulnar nerve motor and sensory are intact.  Skin: No notable ecchymosis or notable skin changes. No cyanosis, clubbing, or rashes. Vascular: Radial pulses intact. Lymphatic: No streaking or epitrochlear adenopathy. The patient is awake, alert, and oriented. Affect appropriate. Cooperative.

## 2025-03-26 NOTE — PATIENT INSTRUCTIONS
[FreeTextEntry1] : HAND SURGERY PATIENTS  Instructions: Trigger finger, CTR, de Quervain's, etc.      1. Maintain hand elevation for 24 to 48 hours. Try to keep your hand higher than your elbow, relative to the floor.  Elevation is one of the best ways to control pain. Swelling usually peaks during this period. After 48 hours, you do not need to maintain elevation if there is no "throbbing" when your hand is lowered. NOTE: Your hand does not have to be elevated continuously.    2. Bathing: Keep your dressing dry. You may wash exposed fingers with a washcloth. You may shower or bathe with a plastic bag protecting the dressing/cast. Once you've changed the dressing, you should wash or bathe with a disposable glove over the wound. The hand will get wet beneath the glove.  When you remove the glove, use a hand towel or paper towel to dry your hand.   3. Bandage: Start changing your bandage two days after surgery (unless told not to). Apply a 3" x 3" or 2" x 2" gauze pad secured with 2-inch generic Coban or 2" Ace bandage. Try to avoid tape, because it leaves adhesive residue on the skin. You can remove the bandage and use your hand for Activities of Daily Living (ADL) with no dressing, if there is no bleeding, when you are inside the house. Once you change the bandage, you MUST reapply a new clean dressing at least once EVERY day.   4. Exercise: It is important to move your fingers, shoulder, and elbow to prevent stiffness. Fully opening and closing your fingers for 5-10 repetitions every hour or two to maintain full range of motion is essential. When you can easily open and close your fingers fully without pain, you should still exercise 3 to 4 times each day, even when movement becomes easy and painless to prevent finger stiffness. You may perform simple activities under 2 to 3 pounds, e.g., holding a phone, writing, simple keyboarding, using eating utensils. You may drive if you feel fully competent and safe controlling the vehicle.   5. Pain: Numbness from the nerve block (local anesthesia) usually lasts 4-8 hours, but sometimes lasts as long as 24-48 hours. For pain management, I recommend Tylenol, Advil 2 or 3 tablets or Aleve 1 or 2 tablets with food.  In general, we try to avoid narcotic/opioid medication. Remember, elevation is one of the best ways to control pain. Pain is normal during and following exercise periods. Note: You must not take more than 4000mg of Tylenol in 24 hours.   6. Bleeding: Blood staining on your dressing is very common, as is the appearance of "black and blue" on exposed fingers or arm. Black and blue discoloration is expected in and around the surgical area.   7. Swelling: Some finger swelling usually occurs. Exercises and elevation will help control swelling. Ask if you may loosen the dressing. It is important to verify that you may do this.   8. Infection: Redness in the skin around the stitches occurs in approximately 1 and 3 patients.  You should apply triple antibiotic ointment (purchase over-the-counter at a pharmacy) once or twice per day to the area of red skin.  If the skin does not become, red you should not apply antibiotic ointment.    Post-operative infections are rare. If you get PROGRESSIVE redness, swelling, and pain for more than 24 hours that does not respond to elevation and rest, or if you start to run a fever, call the office: [phone].   9. Call to schedule, or confirm, your follow up appointment, even if you read this prior to surgery. Arrange follow up approximately 7-13 days after surgery, or at the time recommended by your surgeon.    Thank you.   Sandeep Del Castillo MD

## 2025-03-26 NOTE — PROCEDURE
[FreeTextEntry1] : Full PFT reveals normal flows; FEV1 was  1.44L which is 84% of predicted; normal lung volumes; normal diffusion at14.32, which is 86% of predicted; normal flow volume loop. PFTs were performed to evaluate for SOB

## 2025-03-26 NOTE — PATIENT INSTRUCTIONS
[FreeTextEntry1] : HAND SURGERY PATIENTS  Instructions: Trigger finger, CTR, de Quervain's, etc.      1. Maintain hand elevation for 24 to 48 hours. Try to keep your hand higher than your elbow, relative to the floor.  Elevation is one of the best ways to control pain. Swelling usually peaks during this period. After 48 hours, you do not need to maintain elevation if there is no "throbbing" when your hand is lowered. NOTE: Your hand does not have to be elevated continuously.    2. Bathing: Keep your dressing dry. You may wash exposed fingers with a washcloth. You may shower or bathe with a plastic bag protecting the dressing/cast. Once you've changed the dressing, you should wash or bathe with a disposable glove over the wound. The hand will get wet beneath the glove.  When you remove the glove, use a hand towel or paper towel to dry your hand.   3. Bandage: Start changing your bandage two days after surgery (unless told not to). Apply a 3" x 3" or 2" x 2" gauze pad secured with 2-inch generic Coban or 2" Ace bandage. Try to avoid tape, because it leaves adhesive residue on the skin. You can remove the bandage and use your hand for Activities of Daily Living (ADL) with no dressing, if there is no bleeding, when you are inside the house. Once you change the bandage, you MUST reapply a new clean dressing at least once EVERY day.   4. Exercise: It is important to move your fingers, shoulder, and elbow to prevent stiffness. Fully opening and closing your fingers for 5-10 repetitions every hour or two to maintain full range of motion is essential. When you can easily open and close your fingers fully without pain, you should still exercise 3 to 4 times each day, even when movement becomes easy and painless to prevent finger stiffness. You may perform simple activities under 2 to 3 pounds, e.g., holding a phone, writing, simple keyboarding, using eating utensils. You may drive if you feel fully competent and safe controlling the vehicle.   5. Pain: Numbness from the nerve block (local anesthesia) usually lasts 4-8 hours, but sometimes lasts as long as 24-48 hours. For pain management, I recommend Tylenol, Advil 2 or 3 tablets or Aleve 1 or 2 tablets with food.  In general, we try to avoid narcotic/opioid medication. Remember, elevation is one of the best ways to control pain. Pain is normal during and following exercise periods. Note: You must not take more than 4000mg of Tylenol in 24 hours.   6. Bleeding: Blood staining on your dressing is very common, as is the appearance of "black and blue" on exposed fingers or arm. Black and blue discoloration is expected in and around the surgical area.   7. Swelling: Some finger swelling usually occurs. Exercises and elevation will help control swelling. Ask if you may loosen the dressing. It is important to verify that you may do this.   8. Infection: Redness in the skin around the stitches occurs in approximately 1 and 3 patients.  You should apply triple antibiotic ointment (purchase over-the-counter at a pharmacy) once or twice per day to the area of red skin.  If the skin does not become, red you should not apply antibiotic ointment.    Post-operative infections are rare. If you get PROGRESSIVE redness, swelling, and pain for more than 24 hours that does not respond to elevation and rest, or if you start to run a fever, call the office: [phone].   9. Call to schedule, or confirm, your follow up appointment, even if you read this prior to surgery. Arrange follow up approximately 7-13 days after surgery, or at the time recommended by your surgeon.    Thank you.   Sandeep Del Castillo MD

## 2025-03-26 NOTE — HISTORY OF PRESENT ILLNESS
[FreeTextEntry1] : Patient is 77 yo LHD female with history of multiple hand problems and treatments.  Past hand history: 1. Right CTR 11/3/2023. First postop visit 11/15/2023. 2. Left CTS cortisone injection 11/3/2023. 3. Right trigger thumb cortisone injection 12/28/2022, 3/29/2023. 4. Left carpal tunnel syndrome, NCS: B/O incomplete study, CTS not confirmed. Cortisone injection 3/29/2023. 5. Right carpal tunnel syndrome NCS positive. Cortisone injection 3/29/2023 6. Lt thumb trigger injection: 9/7/2022, 6/27/2022 7. Right basal joint Kenalog injection: 10/23/2024, 6/27/2022, 8/11/2021, 9/9/2020,  8. Left basal joint Kenalog injection: 10/23/2024, 8/21/2021, May 2016, September 2015 9. Left distal radius fracture 12/4/2023, 1/25/2024: Partial filling of fracture line. 2 mm articular step-off. 2.6 mm scapholunate. Ongoing discomfort.  Orthopedic history: Left reverse TSA Matthias Merino MD, approx 9/27/2022. Pt reports that left reverse TSA "is failing". Patient reports that she has not had reconstruction/revision and would like to avoid it.  TODAY: Patient last seen 10/23/2024 for bilateral basal joint arthritis pain.  Patient requested and was treated with bilateral basal joint cortisone injections utilizing Kenalog 40 mg. The patient presents b/o increasing left hand CTS symptoms: driving, phone, holding, nighttime, and often during day. Basal joint injections administered October 2024 provided relief and patient notes ongoing relief in basal joints following those injections.

## 2025-03-26 NOTE — ASSESSMENT
[FreeTextEntry1] :  Ms. MUNOZ is a 77-year-old female with a history of orthopedic injuries and decay (spine, shoulders), appendicitis, gall bladder disease, fibroid uterus, HTN, HLD, hypothyroid, IBS, arthritis, PMR, family history of lung cancer (mother), GERD- SOB, chronic cough, chronic rhinitis, LPR/GERD, ?RAY- stable except one cough-reflux   The patient's SOB is felt to be multifactorial: -poor mechanics of breathing   -poor balance -out of shape/overweight -Pulmonary   -?asthma -Cardiac (Emma)  The patient's chronic cough is felt to be multifactorial: -?cough-variant asthma -GERD -allergies -?bronchiectasis   Problem 1: ?Asthma (?cough-variant asthma) -complete methacholine challenge -Asthma is believed to be caused by inherited (genetic) and environmental factor, but its exact cause is unknown. asthma may be triggered by allergens, lung infections, or irritants in the air. Asthma triggers are different for each person    Problem 2: Chronic rhinitis/?allergies -continue using Neti Pot with saline and nasal rinse -ENT evaluation with Dr. Pyle -continue Flonase 1 sniff/nostril BID  -off Olopatadine 0.6% 1 sniff BID  -s/p blood work: asthma panel, food IgE panel, IgE level, eosinophil level, vitamin D level (WNL) -Environmental measures for allergies were encouraged including mattress and pillow cover, air purifier, and environmental controls.    Problem 3: GERD (Aubrey Hager) -continue Pepcid 40 mg QHS  -continue AcipHex 20mg qAM, pre-breakfast -f/p with Dr. Aubrey Hager -Rule of 2s: avoid eating too much, eating too late, eating too spicy, eating two hours before bed. - Things to avoid including overeating, spicy foods, tight clothing, eating within two hours of bed, this list is not all inclusive. - For treatments of reflux, possible options discussed including diet control, H2 blockers, PPIs, as well as coating motility agents discussed as treatment options. Timing of meals and proximity of last meal to sleep were discussed. If symptoms persist, a formal gastrointestinal evaluation is needed.   Problem 4: Chronic Cough -as above -Any cough greater than eight weeks duration- differential diagnosis includes- asthma, upper airway cough syndrome, post nasal drip syndrome, gastroesophageal reflux, laryngopharyngeal reflux, cardiac disease (congestive heart failure, medicines, effects, etc.), medication effects (b-blockers, ace inhibitors, ARBs, glaucoma meds, etc.), smoking infectious, multifactorial, etc.    Problem 5: NO Bronchiectasis c/w scarring (nodules) -next CT of the chest 3/2026 -Seen on the CT of the chest or chest x-ray signifies damaged bronchial tubes focal or diffuse which can be sites of recurrent infections. These areas can be colonized by various organisms including bacteria (hemophilous influenza/Pseudomonas species etc.) as well as acid fast bacilli (myobacterial disease- inclusive of TB/AMADO etc.). Sputum either for bacteria culture/sensitivity or AFB culture and sensitivity will need to be sent if the patient has sputum- 3 specimens on consecutive days will need to be dropped at the laboratory- if the patient can produce sputum.   Problem 6: ?RAY (risk factors: snoring, poor sleep, elevated Mallampati class, fibromyalgia, LPR, nocturia) ([place]) -complete home sleep study  -Sleep apnea is associated with adverse clinical consequences which can affect most organ systems. Cardiovascular disease risk includes arrhythmias, atrial fibrillation, hypertension, coronary artery disease, and stroke. Metabolic disorders include diabetes type 2, non-alcoholic fatty liver disease. Mood disorder especially depression; and cognitive decline especially in the elderly. Associations with chronic reflux/Barretts esophagus some but not all inclusive.  -Reasons include arousal consistent with hypopnea; respiratory events most prominent in REM sleep or supine position; therefore sleep staging and body position are important for accurate diagnosis and estimation of AHI.    Problem 7: Cardiac -Recommend cardiac follow up evaluation with cardiologist if needed   Problem 8: overweight/out of shape -recommended berberine OTC  -Recommended Ronnell Atkinson's 10-day detox diet and book. - Weight loss, exercise and diet control were discussed and are highly encouraged. Treatment options were given such as aqua therapy, and contacting a nutritionist. Recommended to use the elliptical, stationary bike, less use of treadmill. Mindful eating was explained to the patient. Obesity is associated with worsening asthma, SOB, and potential for cardiac disease, diabetes, and other underlying medical conditions.   Problem 9: Poor mechanics of breathing/ Poor balance -recommended balance therapy -Recommended Domo Garcia and Buteyko breathing techniques -Recommended [email or web address].org and to YouTube "aerobic exercises for seniors" -Proper breathing techniques were reviewed with an emphasis on exhalation. Patient instructed to breath in for 1 second and out for four seconds. Patient was encouraged not to talk while walking.   Problem 10: Health Maintenance -s/p flu shot 2024 -recommended strep pneumonia vaccines: Prevnar-20 vaccine, follow by Pneumo vaccine 23 one year following -recommended early intervention for URIs -recommended regular osteoporosis evaluations -recommended early dermatological evaluations -recommended after the age of 50 to the age of 70, colonoscopy every 5 years   F/P in 4 months pt is encouraged to call or fax the office with any questions or concerns.

## 2025-04-18 NOTE — PROCEDURE
[Anterior rhinoscopy insufficient to account for symptoms] : anterior rhinoscopy insufficient to account for symptoms [Unable to Cooperate with Mirror] : patient unable to cooperate with mirror [de-identified] : Anila Pyle MD [de-identified] : Fiberoptic nasal endoscopy was performed.  R/b/a of procedure was explained to the patient and they agreed to proceed with procedure.    normal mucosa, normal b/l inferior, middle and superior turbinates, inferior, middle and superior meati and sphenoethmoidal recess.  No nasal polyps.  Septum deviated left B/L [facility] CLEAR, nasopharynx is clear  scope #: [id number]

## 2025-04-18 NOTE — PHYSICAL EXAM
[Normal] : mucosa is normal [Midline] : trachea located in midline position [Laryngoscopy Performed] : laryngoscopy was performed, see procedure section for findings [de-identified] : Eczematous changes right [Nasal Endoscopy Performed] : nasal endoscopy was performed, see procedure section for findings [] : septum deviated to the left [de-identified] : Mild crusting right anterior [de-identified] : Pale right posterior side of tongue.

## 2025-04-18 NOTE — HISTORY OF PRESENT ILLNESS
[de-identified] : Ms. MUNOZ is a 77 year female previously seen for cerumen impaction and sensorineural hearing loss had MBS 2/2 hoarseness / choking with liquids - cricopharyngeal bar non obstructing, no aspiration she has been on aciphex twice daily for years At last visit she had yellow/green mucous from the nose and PND for last few months on Flonase daily since the summer At nasal endoscopy she had mucus covering her adenoids-she was started on budesonide rinses and given Augmentin for 10 days  She also had repeat audiogram which demonstrated sensorineural hearing loss and she was given clearance for hearing aids [FreeTextEntry1] : Patient is following up for post nasal drip and hoarseness, taking rabeprazole for acid reflux. Does think its helping. Throat feels better. States has to swallow mucus at times, and constantly clearing throat.  Going for swallow therapy. Got sleep study done last week.   States working on getting hearing aids. Has to say "what" often.

## 2025-04-18 NOTE — ASSESSMENT
[FreeTextEntry1] :   Painful tongue lesion.  Had a similar 1 on the left side which was biopsied by Dr. Page, has an upcoming appointment with Dr. Page - Unclear etiology, agree with follow-up with Dr. Page  adenoiditis - clear today on scope  - continue daily sinus rinses with added Budesonide for 1-2 more weeks. Instructions and information provided, advised pt we will go through [facility] if meds are not covered.   hearing loss - audio last visit shows SNHL  - medically cleared for hearing aids  Eczematous changes: -- mometasone for a few days to ears rx sent   Follow-up 3 to 4 months

## 2025-04-18 NOTE — END OF VISIT
[FreeTextEntry3] : I personally saw and examined GRETA MUNOZ in detail. I spoke to ROLAND Carmen regarding the assessment and plan of care. I performed the procedures and relevant physical exam. I have made changes to the body of the note wherever necessary and appropriate.

## 2025-05-12 NOTE — PHYSICAL EXAM
[Normal] : mucosa is normal [Midline] : trachea located in midline position [de-identified] : MARIO PICKARD [de-identified] : lesion left

## 2025-05-12 NOTE — CONSULT LETTER
[FreeTextEntry1] : Dear Dr. KULDEEP MTZ  I had the pleasure of evaluating your patient GRETA MUNOZ, thank you for allowing us to participate in their care. please see full note detailing our visit below. If you have any questions, please do not hesitate to call me and I would be happy to discuss further.   Marito Lizama M.D. Attending Physician,   Department of Otolaryngology - Head and Neck Surgery Formerly [facility], later [facility]  Office: [phone] Fax: [phone]

## 2025-05-12 NOTE — ASSESSMENT
[FreeTextEntry1] : 76 year old female seen for hoarseness and post nasal drip. On exam, erythema and edema consistent with acid reflux.  - Will continue lifestyle regiment to reduce overproduction of acid and reduce laryngeal reflux including avoiding caffiene, alcohol, eating before bed, peppermint, spicy and fatty foods, and head elevation at night etc. Handout detailing regiment also given. - continue voice therapy  - discussed sending for a swallow study but patient declined.  - follow up with gastroenterologist  - would like to hold off the Atrovent  - consider following up with pcp or pulm for cough, hx of lung cancer (Mother)  Patient also with nasal congestion worse on left. On exam, R DNS.  - continue Flonase. A topical steroid reduce mucosal swelling, illustrated appropriate use and how to reduce the risk of bleeding - Nasal irrigation and showed how to use it to maximize effectiveness  Also with left sided tongue ulcer. did not look like an ulcer, about 7mm in size with rolled edges, no mass palplated but still stable ulceration. took a piece form the edge last tie, has healed. ?  as has UC, maybe crones  - Left tongue, biopsy 07/22/24 - Tongue mucosa with squamous hyperplasia, hyperparakeratosis, ulceration, and chronic inflammation - continue with Dr. Mann  - pt following up with rheumatologist

## 2025-05-12 NOTE — PROCEDURE
[FreeTextEntry6] : Procedure performed: Nasal Endoscopy- Diagnostic Pre-op indication(s): nasal congestion Post-op indication(s): nasal congestion  Verbal and/or written consent obtained from patient Anterior rhinoscopy insufficient to account for symptoms Scope #: 3,  flexible fiber optic telescope  The scope was introduced in the nasal passage between the middle and inferior turbinates to exam the inferior portion of the middle meatus and the fontanelle, as well as the maxillary ostia.  Next, the scope was passed medically and posteriorly to the middle turbinates to examine the sphenoethmoid recess and the superior turbinate region. Upon visualization the finders are as follows: Nasal Septum: R DNS Bilateral - Mucosa: boggy turbinates, Mucous: clear secretions dripping, Polyp: not seen, Inferior Turbinate: boggy, Middle Turbinate: normal, Superior Turbinate: normal, Inferior Meatus: narrow, Middle Meatus: narrow, Super Meatus:normal, Sphenoethmoidal Recess: clear [de-identified] : Procedure performed: laryngeal Endoscopy- Diagnostic Pre-op/post op indication: dysphonia Verbal and/or written consent obtained from patient, Patient was unable to cooperate with mirror Scope #: 3, flexible fiber optic telescope used  Scope was introduced through the nose passed on the floor of the nose to the nasopharynx and then followed down the soft palate to the lower pharynx. The tongue Base, Larynx, Hypopharynx were examined. Base of tongue was symmetric, vallecular was clear, epiglottis was not deformed, subglottis/ pyriform and posterior pharyngeal walls were clear. No erythema, edema, pooling of secretions, masses or lesions. Airway patent, no foreign body visualized. Postcricoid area with moderate erythema and mild edema. + intra-artenoid bar. No pooling of secretions. True vocal cords, vestibular folds, ventricles, pyriform sinuses, and aryepiglottic folds appear normal bilaterally. Vocal cords mobile with good contact b/l. significant edema of larynx and secretions consistent with post nasal drip.

## 2025-05-12 NOTE — HISTORY OF PRESENT ILLNESS
[de-identified] : 76 year old with history of reflux, hoarse voice, difficulty swallowing and persistent cough. Last seen 7/22 at which time left tongue lesion excised.  Left tongue, biopsy - Tongue mucosa with squamous hyperplasia, hyperparakeratosis, ulceration, and chronic inflammation still having left tongue discomfort and mild swelling. Denies bleeding, pus and fever. Still with persistent voice hoarseness and difficulty swallowing liquids. Frequently clearing her throat despite taking Famotidine qhs. [FreeTextEntry1] : Patient is following up with constant throat clearing and post nasal drip. States last month was treated with antibiotics by Dr Pyle for mucus and that helps. Discontinued budesonide washes.   States ulcer on tongue healed on left side, but moved to right side. Seeing Dr Paeg.

## 2025-05-12 NOTE — END OF VISIT
[FreeTextEntry3] : I personally saw and examined the patient in detail. I spoke to ASHLEY Dumont regarding the assessment and plan of care.  I preformed the procedures and I reviewed the above assessment and plan of care, and agree. I have made changes in changes in the body of the note where appropriate.

## 2025-05-28 NOTE — PHYSICAL EXAM
[No Acute Distress] : no acute distress [Normal Rate/Rhythm] : normal rate/rhythm [Normal S1, S2] : normal s1, s2 [Oriented x3] : oriented x3 [Normal Affect] : normal affect

## 2025-05-28 NOTE — HISTORY OF PRESENT ILLNESS
[FreeTextEntry1] : Patient is 75 yo LHD female with history of multiple hand problems and treatments.  Past hand history: 1. Right CTR 11/3/2023. First postop visit 11/15/2023. 2. Left CTS cortisone injection 11/3/2023. 3. Right trigger thumb cortisone injection 12/28/2022, 3/29/2023. 4. Left carpal tunnel syndrome, NCS: B/O incomplete study, CTS not confirmed. Cortisone injection 3/29/2023. 5. Right carpal tunnel syndrome NCS positive. Cortisone injection 3/29/2023 6. Lt thumb trigger injection: 9/7/2022, 6/27/2022 7. Right basal joint Kenalog injection: 10/23/2024, 6/27/2022, 8/11/2021, 9/9/2020, 8. Left basal joint Kenalog injection: 10/23/2024, 8/21/2021, May 2016, September 2015 9. Left distal radius fracture 12/4/2023, 1/25/2024: Partial filling of fracture line. 2 mm articular step-off. 2.6 mm scapholunate. Ongoing discomfort.  Orthopedic history: Left reverse TSA Matthias Merino MD, approx 9/27/2022. Pt reports that left reverse TSA "is failing". Patient reports that she has not had reconstruction/revision and would like to avoid it.  TODAY: Patient returns for 1st postoperative visit following left CTR and cortisone injection right and left basal joints for osteoarthritis.  DOS 5/16/2025. The patient reports that the nocturnal exacerbations and pain have subsided. Patient states that she has been able to sleep without interruption from carpal tunnel syndrome related discomfort. Patient states that the subjective sensation is much improved although not 100% recovered, there is only "a tiny tiny bit of numbness" remaining. Patient states that basal joints treated with cortisone injections are improved and she does not have basal joint pain. No additional or new complaints.

## 2025-05-28 NOTE — ASSESSMENT
[FreeTextEntry1] : 76 yo GERD, HTN, colitis, RAY w/ CPAP who presents to establish care.   - compliant with CPAP  - ? APAP -will obtain compliance report  - lung nodules on CT chest - has family history of lung cancer - mother  will repeat  - otherwise feeling well   I spent 38 minutes during this encounter. Total time excludes separate billing services.

## 2025-05-28 NOTE — PHYSICAL EXAM
[de-identified] : Left hand Carpal tunnel incision wound is healed, clean and dry. No redness or sign of infection. Sutures removed. Minimal swelling. Good finger flexion/extension. Subjective sensation is much improved with only "a tiny tiny bit" of decreased sensation in the median distribution compared to normal. Basal joint manipulation no pain or tenderness. No additional pertinent positive contributory findings.  Right hand Basal joint manipulation and joint palpation did not provoke pain. Subjective sensation intact. No additional pertinent positive contributory findings.

## 2025-05-28 NOTE — ASSESSMENT
[FreeTextEntry1] : Patient has excellent early result after left carpal tunnel release.  Patient reports resolution of nighttime and morning exacerbations and pain secondary to carpal tunnel syndrome.  Patient reports markedly improved sensation following carpal tunnel release. Bilateral basal joint arthritis is minimally to not at all symptomatic following the cortisone injections administered into basal joints at the time of surgery. Patient has no other notable active hand problem requiring treatment.  Postop instructions including activities, use of hand/wrist, wound care, limitations and expectations have been explained, discussed, and reviewed with the patient.  The patient should be cautious in activities for one to two weeks.  Thereafter, the patient should increase to full use as pain permits.  If the patient is comfortable and doing well, then the patient should return p.r.n.  If there are questions or problems, the patient should call, or will be seen as needed.  All questions were answered.  The patient has expressed acceptance and understanding of analysis, treatment, and recommendations.

## 2025-05-28 NOTE — PHYSICAL EXAM
[de-identified] : Left hand Carpal tunnel incision wound is healed, clean and dry. No redness or sign of infection. Sutures removed. Minimal swelling. Good finger flexion/extension. Subjective sensation is much improved with only "a tiny tiny bit" of decreased sensation in the median distribution compared to normal. Basal joint manipulation no pain or tenderness. No additional pertinent positive contributory findings.  Right hand Basal joint manipulation and joint palpation did not provoke pain. Subjective sensation intact. No additional pertinent positive contributory findings.

## 2025-06-09 NOTE — PROCEDURE
[FreeTextEntry1] : Maskfitting Patient is currently using a ResMed N30 nasal mask, size Medium. Patient is doing great with her current mask and does not have any air leakage.  She has a score on Airview of 100 and is compliant.  No issues. GRETA MUNOZ is comfortable with her current mask. We went over proper sanitation instructions for her equipment, and how to wear her mask properly.

## 2025-06-10 NOTE — DISCUSSION/SUMMARY
[FreeTextEntry1] : rUTIs: -Start low dose vaginal estrogen -We discussed not treating asymptomatic UTIs/bacteriuria  Rectocele:  -findings reviewed with patient. She denies bothersome symptoms. If symptoms develop, will notify me  COLIN: -we reviewed her symptoms. She denies bother and will defer further workup and treatment at this time. She will notify me if symptoms become bothersome  h/o  OAB: -start behavioral and fluid modifications -stop oxybutynin -treat severe atrophy with low dose vaginal estrogen -if symptoms return, will start a different OAB medication (Beta 3 agonist)  RTO in 3-4 mo for follow up, or sooner if issues arise  The following treatment plan was designed for this patient and provided to her in written form and reviewed extensively. Patient was given a copy to take home:   For Urinary Symptoms (frequency, urgency, difficulty holding urine): **Total fluids: 34-50 oz (1-1.5 Liters) per day   -Ex. 8 oz coffee or tea (NOT BOTH!), 2-3 bottles of water (Each bottle is 16.9 oz). No flavoring added. No seltzer or Pelligrino!  -Drink slowly throughout day, no binge drinking (each bottle of water should take you hours, not minutes) **Avoid: 2nd cup coffee or tea (even if decaf), alcohol, carbonation (soda, seltzer), spicy foods, citrus, artificial sweeteners, chocolate **Stop eating and drinking 2-3 hours before bedtime (sips ok)  -Try the above fluid changes and stop Oxybutynin. If Urinary symptoms return, notify my office and we can start another medication for overactive bladder.    To prevent UTIs: -Start low dose vaginal estrogen (instructions attached) -Do not treat asymptomatic UTIs  Vaginal Atrophy and Genitourinary syndrome of menopause Start low dose vaginal estrogen: Yuvafem/Estradiol vaginal tablet. It comes in an applicator that is inserted into the vagina at bedtime. While lying in bed, gently insert the pre-filled applicator with vagifem tablet into the vagina ~2 inches inside the vagina. Hit the button to release the tablet into the vagina. Let the tablet sit inside the vagina overnight where it will absorb.   Directions: Use every night for 2 weeks (loading dose), then decrease to twice weekly at bedtime (Mon/Thursday). Only do loading dose when starting medication for first time. Once refill medication, do twice weekly only. This is a long term medication, you have been given refills  This medication takes several weeks to start working If not covered by insurance, call insurance company and ask which form of vaginal estrogen is covered. Call my office (mon-fri) with name of medication so we can change Rx

## 2025-06-10 NOTE — HISTORY OF PRESENT ILLNESS
[Uterine Prolapse] : no [Vaginal Wall Prolapse] : no [Rectal Prolapse] : no [Unable To Restrain Bowel Movement] : no [Urinary Frequency] : no [Urinary Tract Infection] : no [Constipation Obstructed Defecation] : no [Incomplete Emptying Of Stool] : no [Stool Visible Blood] : no [de-identified] : 4-5x [de-identified] : 1-2x [FreeTextEntry1] : Ana is a 77F here for evaluation of recurrent UTIs. She reports having about 3 culture-proven UTIs in the past 6 months. However, she denies any UTI symptoms (urgency, frequency, dysuria, etc) at the time of these cultures. Has occasional, non-bothersome symptoms of COLIN. Has been taking oxybutynin for OAB since ~2019. Denies history of nephrolithiasis, incomplete bladder emptying, vaginal dryness, urgency/UUI, fecal incontinence. She is currently sexually active.  PMH: OAB, IBS, colitis, HTN, HLD, Hypothyroidism, gout, Polymyalgia rheumatica, Melanoma 1982  PSH: hysterectomy for fibroids 1998, Appendectomy 1957, LSC cholecystectomy 2000, Bilateral knee replacements 2007, Shoulder replacements 2007 and 2018, Lumbar spine laminectomies and fusion 2011, cataract surgery 2017, tonsillectomy childhood, Excision of melanoma on leg 1982, port-site hernia repair x 3 2024  Daily fluid intake: 2-3 16.9 oz water bottles, 3 cups coffee, 1 cup tea  CT AP (2025): no evidence of stones or other  abnormalities

## 2025-06-10 NOTE — PHYSICAL EXAM
[Chaperoned Physical Exam] : A chaperone was present in the examining room during all aspects of the physical examination. [Vulvar Atrophy] : vulvar atrophy [Labia Majora] : were normal [Atrophy] : atrophy [Dry Mucosa] : dry mucosa [No Bleeding] : there was no active vaginal bleeding [Ap ____] : Ap [unfilled] [Bp ____] : Bp [unfilled] [Absent] : absent [Normal] : no abnormalities [Normal rectal exam] : was normal [Post Void Residual ____ml] : post void residual was [unfilled] ml [FreeTextEntry1] : General: Well, appearing. Alert and orientated. No acute distress HEENT: Normocephalic, atraumatic and extraocular muscles appear to be intact  Neck: Full range of motion, no obvious lymphadenopathy, deformities, or masses noted  Respiratory: Speaking in full sentences comfortably, normal work of breathing and no cough during visit Musculoskeletal: active full range of motion in extremities  Extremities: No upper extremity edema noted Skin: no obvious rash or skin lesions Neuro: Orientated X 3, speech is fluent, normal rate Psych: Normal mood and affect    [Tenderness] : ~T no ~M abdominal tenderness observed [Distended] : not distended [FreeTextEntry4] : severe atrophy

## 2025-06-13 NOTE — HISTORY OF PRESENT ILLNESS
[de-identified] : This is very nice 77 year-old woman experiencing right hip and groin pain, which is severe in intensity. The pain substantially limits activities of daily living. Walking tolerance is reduced. I evaluated her for right hip pain 1 year ago and found it to be extraarticular which turned out she had 3 hernias that were repaired and the pain resolved. Now pain in the groin again. She did have a hip joint injection about 8 months ago but she does not recall if this helped.

## 2025-06-13 NOTE — DISCUSSION/SUMMARY
[de-identified] : This patient has right hip pain. An extensive discussion was conducted on the natural history of the disease and the variety of surgical and non-surgical options available to the patient including, but not limited to non-steroidal anti-inflammatory medications, steroid injections, physical therapy, maintenance of ideal body weight, and reduction of activity. She will try a right hip intraarticular cortisone injection for diagnostic and therapeutic purposes. The patient will schedule an appointment as needed.

## 2025-06-13 NOTE — PHYSICAL EXAM
[de-identified] : Patient is well nourished, well-developed, in no acute distress, with appropriate mood and affect. The patient is oriented to time, place, and person. Respirations are even and unlabored. Gait evaluation does not reveal a limp. There is no inguinal adenopathy.  The right limb is well-perfused and showed 2+ dp/pt pulses, without skin lesions, shows a grossly normal motor and sensory examination. Examination of the hip shows no skin lesions. Hip motion is full and painless from 0-90 degrees extension to flexion, 20 degrees adduction and 20 degrees abduction, and 15 degrees internal and 30 degrees external rotation. FADIR is negative and LILY is negative. Stinchfield test is negative. Include Location In Plan?: No [de-identified] : AP pelvis, AP and lateral hip radiographs of the right hip views were ordered and taken in the office and demonstrate no evidence of degenerative joint disease of the hip with maintained joint space and no evidence of fractures or other intraarticular pathology. Detail Level: Zone Detail Level: Detailed

## 2025-07-11 NOTE — END OF VISIT
[FreeTextEntry3] :   I, Dr. Pyle, personally performed the evaluation and management (E/M) services for this established patient, who presents today with an exacerbation of an existing condition.  That E/M includes conducting the clinically appropriate interval history &/or exam, assessing all new/exacerbated conditions, and establishing a new plan of care.  Today, my DOMINIQUE, Tamia RAMIREZ, was here to observe my evaluation and management service for this new problem & follow the plan of care established by me going forward.

## 2025-07-11 NOTE — PHYSICAL EXAM
[Nasal Endoscopy Performed] : nasal endoscopy was performed, see procedure section for findings [] : septum deviated to the left [Normal] : mucosa is normal [Midline] : trachea located in midline position [Laryngoscopy Performed] : laryngoscopy was performed, see procedure section for findings [de-identified] : b/l cerumen impaction

## 2025-07-11 NOTE — PROCEDURE
[FreeTextEntry3] : After informed verbal consent is obtained, cerumen impaction is removed from the b/l ear canal with a curette & suction. Pt tolerated well, no residual ear canal irritation.    [de-identified] : Anila Pyle MD [de-identified] : reason for exam: anterior rhinoscopy insufficient for symptom evaluation   Fiberoptic nasal endoscopy was performed.  R/b/a of procedure was explained to the patient and they agreed to proceed with procedure.  .  normal mucosa, normal b/l inferior, middle and superior turbinates, inferior, middle and superior meati and sphenoethmoidal recess.  No nasal polyps.  Septum midline, NP adenoids clear but with thin clear secretions  scope: 200  [de-identified] : reason for laryngoscopy: unable to cooperate with mirror  Fiberoptic laryngoscopy was performed.  R/b/a of procedure was explained to the patient and they agreed to proceed with procedure.   NC: normal b/l, NP: clear no NP mass or lesions, no adenoid hypertrophy.  Oropharynx/Hypopharynx clear no masses or lesions, posterior pharyngeal wall clear.  No BOT hypertrophy, vallecula is clear of any masses or cysts.  Supraglottis clear no evidence of masses, lesions or reflux, epiglottis sharp with normal bilateral arytenoids, aryepiglottic folds, ventricles.  Glottis clear, TVCs mobile b/l, subglottis clear.  No pooling of secretions in the pyriform sinus.    Scope #: 200 none

## 2025-07-11 NOTE — HISTORY OF PRESENT ILLNESS
[de-identified] : Ms. MUNOZ is a 77 year female previously seen for cerumen impaction and sensorineural hearing loss had MBS 2/2 hoarseness / choking with liquids - cricopharyngeal bar non obstructing, no aspiration she has been on aciphex twice daily for years At last visit she had yellow/green mucous from the nose and PND for last few months on Flonase daily since the summer At nasal endoscopy she had mucus covering her adenoids-she was started on budesonide rinses and given Augmentin for 10 days  She also had repeat audiogram which demonstrated sensorineural hearing loss and she was given clearance for hearing aids MBS 3/2025 no aspiration hoarseness taking rabeprazole for GERD, throat is improved, completed swallow therapy in May sleep study completed and she started with CPAP use 2 months ago  [FreeTextEntry1] : pt here for 3 month f/u she continues with rabeprazole, throat is ok still with hoarseness but does have some choking with thin liquids sees GI Dr. Hager. she is still using Flonase daily she has seen Dr. Jimenez for tongue lesion, no further biopsy and currently using a cream which she feels has worked well she has an appt with Dr. Jimenez next week recently started with a new Pulm for RAY and CPAP use is going well nose is good today, no congestion

## 2025-07-11 NOTE — ASSESSMENT
[FreeTextEntry1] : Painful tongue lesion.  Had a similar 1 on the left side which was biopsied by Dr. Page, has an upcoming appointment with Dr. Page - has f/u with Dr. Page next week   hearing loss - cerumen cleared today AU - a prior audio shows SNHL 3/14/25 - medically cleared for hearing aids, she is not ready for use - annual audio due 3/2026  LPRD / choking with liquids: scope show reflux well controlled  - prior MBS shows no aspiration and she has completed swallow therapy - would c/w Flonase daily  - c/w PPI  - f/u with GI  Follow-up 3 to 4 months

## 2025-07-25 NOTE — PHYSICAL EXAM
[de-identified] : Patient is well nourished, well-developed, in no acute distress, with appropriate mood and affect. The patient is oriented to time, place, and person. Respirations are even and unlabored. Gait evaluation does not reveal a limp. There is no inguinal adenopathy.  The right limb is well-perfused and showed 2+ dp/pt pulses, without skin lesions, shows a grossly normal motor and sensory examination. Examination of the hip shows no skin lesions. Hip motion is full and painless from 0-90 degrees extension to flexion, 20 degrees adduction and 20 degrees abduction, and 15 degrees internal and 30 degrees external rotation. FADIR is negative and LILY is negative. Stinchfield test is negative.

## 2025-07-25 NOTE — DISCUSSION/SUMMARY
[de-identified] : This patient has right hip pain. An extensive discussion was conducted on the natural history of the disease and the variety of surgical and non-surgical options available to the patient including, but not limited to non-steroidal anti-inflammatory medications, steroid injections, physical therapy, maintenance of ideal body weight, and reduction of activity.  Continue weight-bear as tolerated.  She will take NSAIDs for pain.  Continue home exercise program.. The patient will schedule an appointment as needed.

## 2025-07-25 NOTE — HISTORY OF PRESENT ILLNESS
[de-identified] : This is very nice 77 year-old woman experiencing right hip and groin pain, which is severe in intensity. The pain substantially limits activities of daily living. Walking tolerance is reduced.  I have previously diagnosed with right intra-articular cortisone injection.  She recently had a right hip intra-articular cortisone injection which completely relieved her pain.